# Patient Record
Sex: FEMALE | Race: WHITE | NOT HISPANIC OR LATINO
[De-identification: names, ages, dates, MRNs, and addresses within clinical notes are randomized per-mention and may not be internally consistent; named-entity substitution may affect disease eponyms.]

---

## 2024-09-24 ENCOUNTER — APPOINTMENT (OUTPATIENT)
Facility: CLINIC | Age: 59
End: 2024-09-24
Payer: COMMERCIAL

## 2024-09-24 ENCOUNTER — APPOINTMENT (OUTPATIENT)
Facility: CLINIC | Age: 59
End: 2024-09-24

## 2024-09-24 DIAGNOSIS — Z98.890 ACQUIRED DEFORMITY OF NOSE: ICD-10-CM

## 2024-09-24 DIAGNOSIS — C44.311 BASAL CELL CARCINOMA OF SKIN OF NOSE: ICD-10-CM

## 2024-09-24 DIAGNOSIS — M95.0 ACQUIRED DEFORMITY OF NOSE: ICD-10-CM

## 2024-09-24 PROBLEM — Z00.00 ENCOUNTER FOR PREVENTIVE HEALTH EXAMINATION: Status: ACTIVE | Noted: 2024-09-24

## 2024-09-24 PROCEDURE — 99204 OFFICE O/P NEW MOD 45 MIN: CPT

## 2024-09-24 PROCEDURE — XXXXX: CPT | Mod: 1L

## 2024-09-25 PROBLEM — C44.311 BASAL CELL CARCINOMA (BCC) OF SUPRATIP OF NOSE: Status: ACTIVE | Noted: 2024-09-25

## 2024-09-25 PROBLEM — M95.0 MOHS DEFECT OF NOSE: Status: ACTIVE | Noted: 2024-09-25

## 2024-09-25 NOTE — HISTORY OF PRESENT ILLNESS
[FreeTextEntry1] : Patient underwent Mohs surgery approximately a year ago then had a full-thickness skin graft which resulted in the contour defect which then required a second stage for cartilage grafting underneath the skin graft.  That is all healed well now the contour correction is excellent the color mismatch is being treated with laser by Dr. Martinez follow-up with me is as needed she is satisfied with the result and she looks good.